# Patient Record
Sex: MALE | Race: WHITE | Employment: OTHER | URBAN - METROPOLITAN AREA
[De-identification: names, ages, dates, MRNs, and addresses within clinical notes are randomized per-mention and may not be internally consistent; named-entity substitution may affect disease eponyms.]

---

## 2023-01-25 DIAGNOSIS — G50.0 TRIGEMINAL NEURALGIA OF RIGHT SIDE OF FACE: Primary | ICD-10-CM

## 2023-02-01 RX ORDER — POTASSIUM CHLORIDE 20MEQ/15ML
LIQUID (ML) ORAL DAILY
COMMUNITY

## 2023-02-01 RX ORDER — ELECTROLYTES/DEXTROSE
SOLUTION, ORAL ORAL
COMMUNITY

## 2023-02-01 RX ORDER — MELOXICAM 15 MG/1
15 TABLET ORAL DAILY
COMMUNITY

## 2023-02-01 RX ORDER — AMLODIPINE BESYLATE 10 MG/1
10 TABLET ORAL DAILY
COMMUNITY

## 2023-02-01 RX ORDER — LANOLIN ALCOHOL/MO/W.PET/CERES
3 CREAM (GRAM) TOPICAL DAILY
COMMUNITY

## 2023-02-01 RX ORDER — PREGABALIN 300 MG/1
300 CAPSULE ORAL 2 TIMES DAILY
COMMUNITY

## 2023-02-01 RX ORDER — OMEPRAZOLE 20 MG/1
20 CAPSULE, DELAYED RELEASE ORAL DAILY
COMMUNITY

## 2023-02-01 RX ORDER — BUPRENORPHINE HYDROCHLORIDE AND NALOXONE HYDROCHLORIDE DIHYDRATE 8; 2 MG/1; MG/1
1.5 TABLET SUBLINGUAL DAILY
COMMUNITY

## 2023-02-01 RX ORDER — MIRTAZAPINE 30 MG/1
15 TABLET, FILM COATED ORAL NIGHTLY
COMMUNITY

## 2023-02-01 RX ORDER — OXCARBAZEPINE 300 MG/1
300 TABLET, FILM COATED ORAL 2 TIMES DAILY
COMMUNITY

## 2023-02-06 ENCOUNTER — HOSPITAL ENCOUNTER (OUTPATIENT)
Dept: CT IMAGING | Age: 73
Discharge: HOME | End: 2023-02-06
Payer: MEDICARE

## 2023-02-06 ENCOUNTER — HOSPITAL ENCOUNTER (OUTPATIENT)
Dept: MRI IMAGING | Age: 73
Discharge: HOME | End: 2023-02-06
Payer: MEDICARE

## 2023-02-06 ENCOUNTER — ANESTHESIA EVENT (OUTPATIENT)
Dept: RADIATION ONCOLOGY | Age: 73
End: 2023-02-06

## 2023-02-06 ENCOUNTER — HOSPITAL ENCOUNTER (OUTPATIENT)
Dept: RADIATION ONCOLOGY | Age: 73
Discharge: HOME | End: 2023-02-06
Payer: MEDICARE

## 2023-02-06 ENCOUNTER — ANESTHESIA (OUTPATIENT)
Dept: RADIATION ONCOLOGY | Age: 73
End: 2023-02-06

## 2023-02-06 VITALS
RESPIRATION RATE: 18 BRPM | OXYGEN SATURATION: 96 % | HEIGHT: 70 IN | HEART RATE: 57 BPM | WEIGHT: 173.2 LBS | DIASTOLIC BLOOD PRESSURE: 87 MMHG | TEMPERATURE: 98.1 F | SYSTOLIC BLOOD PRESSURE: 120 MMHG | BODY MASS INDEX: 24.79 KG/M2

## 2023-02-06 PROCEDURE — 6360000002 HC RX W HCPCS: Performed by: NEUROLOGICAL SURGERY

## 2023-02-06 PROCEDURE — 3700000001 HC ADD 15 MINUTES (ANESTHESIA)

## 2023-02-06 PROCEDURE — 6360000004 HC RX CONTRAST MEDICATION: Performed by: NEUROLOGICAL SURGERY

## 2023-02-06 PROCEDURE — 2500000003 HC RX 250 WO HCPCS: Performed by: NEUROLOGICAL SURGERY

## 2023-02-06 PROCEDURE — 3700000000 HC ANESTHESIA ATTENDED CARE

## 2023-02-06 PROCEDURE — 6370000000 HC RX 637 (ALT 250 FOR IP): Performed by: NEUROLOGICAL SURGERY

## 2023-02-06 PROCEDURE — A9579 GAD-BASE MR CONTRAST NOS,1ML: HCPCS | Performed by: NEUROLOGICAL SURGERY

## 2023-02-06 PROCEDURE — 70470 CT HEAD/BRAIN W/O & W/DYE: CPT

## 2023-02-06 PROCEDURE — 70553 MRI BRAIN STEM W/O & W/DYE: CPT

## 2023-02-06 RX ORDER — DEXAMETHASONE SODIUM PHOSPHATE 4 MG/ML
4 INJECTION, SOLUTION INTRA-ARTICULAR; INTRALESIONAL; INTRAMUSCULAR; INTRAVENOUS; SOFT TISSUE ONCE
Status: COMPLETED | OUTPATIENT
Start: 2023-02-06 | End: 2023-02-06

## 2023-02-06 RX ORDER — PREGABALIN 150 MG/1
300 CAPSULE ORAL ONCE
Status: COMPLETED | OUTPATIENT
Start: 2023-02-06 | End: 2023-02-06

## 2023-02-06 RX ORDER — AMLODIPINE BESYLATE 10 MG/1
10 TABLET ORAL ONCE
Status: COMPLETED | OUTPATIENT
Start: 2023-02-06 | End: 2023-02-06

## 2023-02-06 RX ORDER — ONDANSETRON 2 MG/ML
4 INJECTION INTRAMUSCULAR; INTRAVENOUS ONCE
Status: COMPLETED | OUTPATIENT
Start: 2023-02-06 | End: 2023-02-06

## 2023-02-06 RX ORDER — PROPOFOL 10 MG/ML
INJECTION, EMULSION INTRAVENOUS PRN
Status: DISCONTINUED | OUTPATIENT
Start: 2023-02-06 | End: 2023-02-06 | Stop reason: SDUPTHER

## 2023-02-06 RX ORDER — BUPIVACAINE HYDROCHLORIDE 5 MG/ML
30 INJECTION, SOLUTION EPIDURAL; INTRACAUDAL ONCE
Status: COMPLETED | OUTPATIENT
Start: 2023-02-06 | End: 2023-02-06

## 2023-02-06 RX ORDER — ACETAMINOPHEN 325 MG/1
650 TABLET ORAL EVERY 4 HOURS PRN
Status: DISCONTINUED | OUTPATIENT
Start: 2023-02-06 | End: 2023-02-07 | Stop reason: HOSPADM

## 2023-02-06 RX ORDER — 0.9 % SODIUM CHLORIDE 0.9 %
500 INTRAVENOUS SOLUTION INTRAVENOUS ONCE
Status: DISCONTINUED | OUTPATIENT
Start: 2023-02-06 | End: 2023-02-07 | Stop reason: HOSPADM

## 2023-02-06 RX ORDER — OXCARBAZEPINE 300 MG/1
300 TABLET, FILM COATED ORAL ONCE
Status: COMPLETED | OUTPATIENT
Start: 2023-02-06 | End: 2023-02-06

## 2023-02-06 RX ORDER — EVENING PRIMROSE OIL 500 MG
100 CAPSULE ORAL ONCE
Status: DISCONTINUED | OUTPATIENT
Start: 2023-02-06 | End: 2023-02-07 | Stop reason: HOSPADM

## 2023-02-06 RX ORDER — SODIUM BICARBONATE 42 MG/ML
1.5 INJECTION, SOLUTION INTRAVENOUS ONCE
Status: COMPLETED | OUTPATIENT
Start: 2023-02-06 | End: 2023-02-06

## 2023-02-06 RX ORDER — BACITRACIN, NEOMYCIN, POLYMYXIN B 400; 3.5; 5 [USP'U]/G; MG/G; [USP'U]/G
OINTMENT TOPICAL ONCE
Status: COMPLETED | OUTPATIENT
Start: 2023-02-06 | End: 2023-02-06

## 2023-02-06 RX ORDER — LORAZEPAM 2 MG/ML
1 INJECTION INTRAMUSCULAR EVERY 6 HOURS PRN
Status: DISCONTINUED | OUTPATIENT
Start: 2023-02-06 | End: 2023-02-07 | Stop reason: HOSPADM

## 2023-02-06 RX ORDER — SODIUM CHLORIDE, SODIUM LACTATE, POTASSIUM CHLORIDE, CALCIUM CHLORIDE 600; 310; 30; 20 MG/100ML; MG/100ML; MG/100ML; MG/100ML
INJECTION, SOLUTION INTRAVENOUS CONTINUOUS PRN
Status: DISCONTINUED | OUTPATIENT
Start: 2023-02-06 | End: 2023-02-06 | Stop reason: SDUPTHER

## 2023-02-06 RX ORDER — LIDOCAINE HYDROCHLORIDE 10 MG/ML
30 INJECTION, SOLUTION EPIDURAL; INFILTRATION; INTRACAUDAL; PERINEURAL ONCE
Status: COMPLETED | OUTPATIENT
Start: 2023-02-06 | End: 2023-02-06

## 2023-02-06 RX ADMIN — BACITRACIN, NEOMYCIN, POLYMYXIN B: 400; 3.5; 5 OINTMENT TOPICAL at 08:02

## 2023-02-06 RX ADMIN — GADOTERIDOL 17 ML: 279.3 INJECTION, SOLUTION INTRAVENOUS at 10:20

## 2023-02-06 RX ADMIN — LIDOCAINE HYDROCHLORIDE 30 ML: 10 INJECTION, SOLUTION EPIDURAL; INFILTRATION; INTRACAUDAL; PERINEURAL at 07:40

## 2023-02-06 RX ADMIN — PREGABALIN 300 MG: 150 CAPSULE ORAL at 08:04

## 2023-02-06 RX ADMIN — PROPOFOL 20 MG: 10 INJECTION, EMULSION INTRAVENOUS at 07:40

## 2023-02-06 RX ADMIN — PROPOFOL 20 MG: 10 INJECTION, EMULSION INTRAVENOUS at 07:43

## 2023-02-06 RX ADMIN — PROPOFOL 50 MG: 10 INJECTION, EMULSION INTRAVENOUS at 07:39

## 2023-02-06 RX ADMIN — SODIUM CHLORIDE, SODIUM LACTATE, POTASSIUM CHLORIDE, CALCIUM CHLORIDE: 600; 310; 30; 20 INJECTION, SOLUTION INTRAVENOUS at 07:36

## 2023-02-06 RX ADMIN — SODIUM BICARBONATE 1.5 MEQ: 42 INJECTION, SOLUTION INTRAVENOUS at 07:40

## 2023-02-06 RX ADMIN — OXCARBAZEPINE 300 MG: 300 TABLET, FILM COATED ORAL at 08:03

## 2023-02-06 RX ADMIN — PROPOFOL 20 MG: 10 INJECTION, EMULSION INTRAVENOUS at 07:41

## 2023-02-06 RX ADMIN — ONDANSETRON 4 MG: 2 INJECTION INTRAMUSCULAR; INTRAVENOUS at 07:18

## 2023-02-06 RX ADMIN — IOPAMIDOL 80 ML: 755 INJECTION, SOLUTION INTRAVENOUS at 08:53

## 2023-02-06 RX ADMIN — BUPIVACAINE HYDROCHLORIDE 150 MG: 5 INJECTION, SOLUTION EPIDURAL; INTRACAUDAL; PERINEURAL at 07:40

## 2023-02-06 RX ADMIN — AMLODIPINE BESYLATE 10 MG: 10 TABLET ORAL at 08:03

## 2023-02-06 RX ADMIN — DEXAMETHASONE SODIUM PHOSPHATE 4 MG: 4 INJECTION, SOLUTION INTRAMUSCULAR; INTRAVENOUS at 07:40

## 2023-02-06 RX ADMIN — PROPOFOL 20 MG: 10 INJECTION, EMULSION INTRAVENOUS at 07:45

## 2023-02-06 ASSESSMENT — PAIN DESCRIPTION - LOCATION: LOCATION: FACE;MOUTH

## 2023-02-06 ASSESSMENT — PAIN DESCRIPTION - PAIN TYPE: TYPE: CHRONIC PAIN

## 2023-02-06 ASSESSMENT — PAIN DESCRIPTION - DESCRIPTORS: DESCRIPTORS: ACHING

## 2023-02-06 ASSESSMENT — PAIN DESCRIPTION - ORIENTATION: ORIENTATION: RIGHT

## 2023-02-06 NOTE — PROGRESS NOTES
Gamma Knife Radiation Delivery Time Out    1. Patient states name and birthdate correctly? Yes    2. Procedure listed on consent Stereotactic Radiosurgery, Gamma Knife for RIGHT trigeminal neuralgia    3. Is this the correct procedure? Yes    4. Is this a trigeminal neuralgia case? Yes    -If yes, what side are we treating? right    -If yes, is the side marked for laterality? yes    5. Has the final radiologist report been reviewed? yes    6. Does the patient require Keppra prior to treatment delivery? no    7. Does the patient require Ativan prior to treatment delivery?  no    8. Are all present in agreement? Yes    9.  Those present for time out:  Dr. Jo Ann Johns Dr. Vance Records, RN, La Long, RN     La Long, RN

## 2023-02-06 NOTE — ADDENDUM NOTE
Addendum  created 02/06/23 1003 by RADHA Javier - CRNA    Flowsheet accepted, Intraprocedure Flowsheets edited

## 2023-02-06 NOTE — PROGRESS NOTES
After timeout was completed, the patient was put into the treatment position. Patient then started grabbing at the frame. This RN, Lamont Gilbert RN, Bonnie Pelletier, Dr. Juan Jose Galaviz, Dr. Leida Covington, and pt's wife were explaining to patient the importance of keeping his hands at his side during his treatment. Patient was insistent to get up from the treatment table. Patient was assisted to sit up on table per request while Dr. Andria Garibay and this RN explained the radiation portion of the treatment. Wife at table side also explaining that the treatment had not been completed yet. Patient agreed to continue with the treatment and was again placed back into the treatment position. CBCT was completed and treatment was started. Patient began pulling at frame and was attempting to wrap his arms around the outside of the gamma knife machine. Patient yelling \"get me out of here! \"  Treatment was immediately stopped and this DURGA, Bonnie Pelletier, Ritesh Gardner, and Lamont Gilbert RN down in treatment room. Patient immediately removed from treatment table and transferred back to room via wheelchair. Frame removed. Right posterior pin site bleeding noted. Sonja CALIXTO held pressure for 5 minutes. Pin sites were cleaned with alcohol and povidone-iodine. Dr. Andria Garibay then placed sutures at all four pin sites. Patient met Phase II discharge criteria. Pt alert and oriented to person only. See discharge Mavis score and vitals. Discharge instructions were reviewed with patient and patient's spouse who verbalized understanding using teach back method. A written copy of the instructions was given. Patient has follow up calls scheduled. Patient was accompanied to transportation by this RN.     Peggi Claude, RN

## 2023-02-06 NOTE — ANESTHESIA PRE PROCEDURE
Department of Anesthesiology  Preprocedure Note       Name:  Usman Chen   Age:  81 y.o.  :  1950                                          MRN:  [id number]         Date:  2023      Surgeon: * Surgery not found *    Procedure:     Medications prior to admission:   Prior to Admission medications    Medication Sig Start Date End Date Taking? Authorizing Provider   amLODIPine (NORVASC) 10 MG tablet Take 10 mg by mouth daily   Yes Historical Provider, MD   buprenorphine-naloxone (SUBOXONE) 8-2 MG SUBL SL tablet Place 1.5 tablets under the tongue daily. Yes Historical Provider, MD   melatonin 3 MG TABS tablet Take 3 mg by mouth daily   Yes Historical Provider, MD   meloxicam (MOBIC) 15 MG tablet Take 15 mg by mouth daily   Yes Historical Provider, MD   METHYL SALICYLATE-LIDO-MENTHOL EX Apply topically   Yes Historical Provider, MD   mirtazapine (REMERON) 30 MG tablet Take 15 mg by mouth nightly   Yes Historical Provider, MD   Multiple Vitamin (MULTIVITAMIN ADULT) TABS Take by mouth  Patient not taking: Reported on 2023   Yes Historical Provider, MD   omeprazole (PRILOSEC) 20 MG delayed release capsule Take 20 mg by mouth daily   Yes Historical Provider, MD   OXcarbazepine (TRILEPTAL) 300 MG tablet Take 300 mg by mouth 2 times daily   Yes Historical Provider, MD   potassium chloride 20 MEQ/15ML (10%) oral solution Take by mouth daily  Patient not taking: Reported on 2023   Yes Historical Provider, MD   pregabalin (LYRICA) 300 MG capsule Take 300 mg by mouth 2 times daily. Yes Historical Provider, MD       Current medications:    Current Outpatient Medications   Medication Sig Dispense Refill    amLODIPine (NORVASC) 10 MG tablet Take 10 mg by mouth daily      buprenorphine-naloxone (SUBOXONE) 8-2 MG SUBL SL tablet Place 1.5 tablets under the tongue daily.       melatonin 3 MG TABS tablet Take 3 mg by mouth daily      meloxicam (MOBIC) 15 MG tablet Take 15 mg by mouth daily      METHYL SALICYLATE-LIDO-MENTHOL EX Apply topically      mirtazapine (REMERON) 30 MG tablet Take 15 mg by mouth nightly      Multiple Vitamin (MULTIVITAMIN ADULT) TABS Take by mouth (Patient not taking: Reported on 2/6/2023)      omeprazole (PRILOSEC) 20 MG delayed release capsule Take 20 mg by mouth daily      OXcarbazepine (TRILEPTAL) 300 MG tablet Take 300 mg by mouth 2 times daily      potassium chloride 20 MEQ/15ML (10%) oral solution Take by mouth daily (Patient not taking: Reported on 2/6/2023)      pregabalin (LYRICA) 300 MG capsule Take 300 mg by mouth 2 times daily. Current Facility-Administered Medications   Medication Dose Route Frequency Provider Last Rate Last Admin    dexamethasone (DECADRON) injection 4 mg  4 mg IntraDERmal Once Chan Adler MD        bupivacaine (PF) (MARCAINE) 0.5 % injection 150 mg  30 mL IntraDERmal Once Chan Adler MD        0.9 % sodium chloride bolus  500 mL IntraVENous Once Chan Adler MD        lidocaine PF 1 % injection 30 mL  30 mL IntraDERmal Once Chan Adler MD        ondansetron [facility]) injection 4 mg  4 mg IntraVENous Once Chan Adler MD        neomycin-bacitracin-polymyxin (NEOSPORIN) ointment   Topical Once Chan Adler MD        sodium bicarbonate 4.2 % injection 1.5 mEq  1.5 mEq IntraDERmal Once Chan Adler MD        acetaminophen (TYLENOL) tablet 650 mg  650 mg Oral Q4H PRN Chan Adler MD        LORazepam (ATIVAN) injection 1 mg  1 mg IntraVENous Q6H PRN Chan Adler MD        vitamin E capsule 100 Units  100 Units Oral Once Chan Adler MD        OXcarbazepine (TRILEPTAL) tablet 300 mg  300 mg Oral Once Chan Adler MD        pregabalin (LYRICA) capsule 300 mg  300 mg Oral Once Chan Adler MD        amLODIPine (NORVASC) tablet 10 mg  10 mg Oral Once Chan Adler MD           Allergies:     Allergies   Allergen Reactions    Bupropion     Diclofenac     Etodolac Problem List:  There is no problem list on file for this patient. Past Medical History:        Diagnosis Date    AAA (abdominal aortic aneurysm)     Anxiety     Cervicalgia     Chronic back pain     Depression     HTN (hypertension)     Tobacco use     Trigeminal neuralgia of right side of face        Past Surgical History:        Procedure Laterality Date    APPENDECTOMY      BACK SURGERY      CHOLECYSTECTOMY      KNEE SURGERY      RHIZOTOMY W/ RADIOFREQUENCY ABLATION Right     percuaneous stereotactic radiofrequency rhizotomy (PSR) for trigeminal neuralgia    SHOULDER SURGERY         Social History:    Social History     Tobacco Use    Smoking status: Not on file    Smokeless tobacco: Not on file   Substance Use Topics    Alcohol use: Not on file                                Counseling given: Not Answered      Vital Signs (Current):   Vitals:    02/06/23 0628 02/06/23 0630   BP: (!) 143/80    Pulse: 59    Resp: 12    Temp:  98.1 °F (36.7 °C)   TempSrc:  Oral   SpO2: 92%    Weight: 173 lb 3.2 oz (78.6 kg)    Height: 5' 10\" (1.778 m)                                               BP Readings from Last 3 Encounters:   02/06/23 (!) 143/80       NPO Status:                                                                                 BMI:   Wt Readings from Last 3 Encounters:   02/06/23 173 lb 3.2 oz (78.6 kg)     Body mass index is 24.85 kg/m². CBC: No results found for: WBC, RBC, HGB, HCT, MCV, RDW, PLT    CMP: No results found for: NA, K, CL, CO2, BUN, CREATININE, GFRAA, AGRATIO, LABGLOM, GLUCOSE, GLU, PROT, CALCIUM, BILITOT, ALKPHOS, AST, ALT    POC Tests: No results for input(s): POCGLU, POCNA, POCK, POCCL, POCBUN, POCHEMO, POCHCT in the last 72 hours.     Coags: No results found for: PROTIME, INR, APTT    HCG (If Applicable): No results found for: PREGTESTUR, PREGSERUM, HCG, HCGQUANT     ABGs: No results found for: PHART, PO2ART, KRV0PKI, HGP1ELQ, BEART, Z6RCUSZN     Type & Screen (If Applicable):  No results found for: LABABO, LABRH    Drug/Infectious Status (If Applicable):  No results found for: HIV, HEPCAB    COVID-19 Screening (If Applicable): No results found for: COVID19        Anesthesia Evaluation  Patient summary reviewed and Nursing notes reviewed no history of anesthetic complications:   Airway: Mallampati: II  TM distance: >3 FB   Neck ROM: full  Mouth opening: > = 3 FB   Dental:    (+) upper dentures and lower dentures      Pulmonary:                              Cardiovascular:    (+) hypertension:,                   Neuro/Psych:   (+) depression/anxiety              ROS comment: Trigeminal Neuralgia GI/Hepatic/Renal: Neg GI/Hepatic/Renal ROS            Endo/Other: Negative Endo/Other ROS                    Abdominal:             Vascular:   + PVD, aortic or cerebral, . Other Findings:           Anesthesia Plan      MAC     ASA 1    (77-year-old male presents for application of G-frame for gamma knife radiation procedure. Plan MAC with ASA standard monitors. Questions answered. Patient agreeable with anesthetic plan.    )  Induction: intravenous. Anesthetic plan and risks discussed with patient. Plan discussed with CRNA.     Attending anesthesiologist reviewed and agrees with Rosibel Trujillo MD   2/6/2023

## 2023-02-06 NOTE — PROGRESS NOTES
Gamma Knife Frame Placement Time Out     1. Patient states name and birthdate correctly? Yes    2. Procedure listed on consent:  G-Frame placement and Gamma Knife radiosurgery for RIGHT trigeminal neuralgia    3. Is this the correct procedure? Yes    4. Are the consents signed? Yes    5. Is this a trigeminal neuralgia case? Yes    -If yes, what side are we treating? right    -If yes, is the side marked for laterality? yes    6. Have films been reviewed today? Yes    7. Has the interim History and Physical form been completed? yes    8. Has the neurosurgeon reviewed the [facility] RN Pre-Procedure Checklist?  Yes    9. FEMALES ONLY: Does the patient require a pregnancy test per [address]? no     -If yes, the result was:  na    10. Are all present in agreement?   Yes    Those present for time out:  Rocio Velazquez RN , Dr. Lashawn Davenport, DENISSE Velazquez RN

## 2023-02-06 NOTE — DISCHARGE INSTR - COC
Continuity of Care Form    Patient Name: Rafal Duncan   :  1950  MRN:  [id number]    Admit date:  2023  Discharge date:  ***    Code Status Order: No Order   Advance Directives:     Admitting Physician:  No admitting provider for patient encounter. PCP: No primary care provider on file. Discharging Nurse: [facility] Unit/Room#: No information available for this encounter. Discharging Unit Phone Number: ***    Emergency Contact:   No emergency contact information on file. Past Surgical History:  Past Surgical History:   Procedure Laterality Date    APPENDECTOMY      BACK SURGERY      CHOLECYSTECTOMY      KNEE SURGERY      RHIZOTOMY W/ RADIOFREQUENCY ABLATION Right     percuaneous stereotactic radiofrequency rhizotomy (PSR) for trigeminal neuralgia    SHOULDER SURGERY         Immunization History: There is no immunization history on file for this patient. Active Problems: There is no problem list on file for this patient. Isolation/Infection:   Isolation            No Isolation          Patient Infection Status       None to display            Nurse Assessment:  Last Vital Signs: /61   Pulse 54   Temp 98.1 °F (36.7 °C) (Oral)   Resp 12   Ht 5' 10\" (1.778 m)   Wt 173 lb 3.2 oz (78.6 kg)   SpO2 96%   BMI 24.85 kg/m²     Last documented pain score (0-10 scale):    Last Weight:   Wt Readings from Last 1 Encounters:   23 173 lb 3.2 oz (78.6 kg)     Mental Status:  {IP PT MENTAL STATUS:64755}    IV Access:  { MARTELL IV ACCESS:809226672}    Nursing Mobility/ADLs:  Walking   {CHP DME ESLA:212483232}  Transfer  {CHP DME IOSK:656372624}  Bathing  {CHP DME DBK}  Dressing  {CHP DME DIIM:080981054}  Toileting  {CHP DME CJUI:513669500}  Feeding  {CHP DME IHGY:934130587}  Med Admin  {CHP DME EVAS:382257000}  Med Delivery   { MARTELL MED Delivery:079360746}    Wound Care Documentation and Therapy:        Elimination:  Continence:    Bowel: {YES / TK:17569}  Bladder: {YES / NO:00394}  Urinary Catheter: {Urinary Catheter:638838015}   Colostomy/Ileostomy/Ileal Conduit: {YES / OO:59479}       Date of Last BM: ***    Intake/Output Summary (Last 24 hours) at 2023 1105  Last data filed at 2023 0752  Gross per 24 hour   Intake 200 ml   Output --   Net 200 ml     No intake/output data recorded.     Safety Concerns:     [address] Safety Concerns:839824906}    Impairments/Disabilities:      [address] [facility] Impairments/Disabilities:404373640}    Nutrition Therapy:  Current Nutrition Therapy:   [facility] Diet List:545366523}    Routes of Feeding: {CHP DME Other Feedings:211719575}  Liquids: {Slp liquid thickness:90873}  Daily Fluid Restriction: {CHP DME Yes amt example:650927677}  Last Modified Barium Swallow with Video (Video Swallowing Test): {Done Not Done KCXC:771542851}    Treatments at the Time of Hospital Discharge:   Respiratory Treatments: ***  Oxygen Therapy:  {Therapy; copd oxygen:02211}  Ventilator:    { CC Vent XWSW:032128211}    Rehab Therapies: {THERAPEUTIC INTERVENTION:0609678646}  Weight Bearing Status/Restrictions: [address] [place] [address] Weight Bearin}  Other Medical Equipment (for information only, NOT a DME order):  {EQUIPMENT:580602804}  Other Treatments: ***    Patient's personal belongings (please select all that are sent with patient):  {[facility] DME Belongings:192813085}    RN SIGNATURE:  {Esignature:917080330}    CASE MANAGEMENT/SOCIAL WORK SECTION    Inpatient Status Date: ***    Readmission Risk Assessment Score:  Readmission Risk              Risk of Unplanned Readmission:  0           Discharging to Facility/ Agency   Name:   Address:  Phone:  Fax:    Dialysis Facility (if applicable)   Name:  Address:  Dialysis Schedule:  Phone:  Fax:    / signature: {Esignature:103782795}    PHYSICIAN SECTION    Prognosis: {Prognosis:9790583108}    Condition at Discharge: [facility] Patient Condition:342919087}    Rehab Potential (if transferring to Rehab): {Prognosis:4341199473}    Recommended Labs or Other Treatments After Discharge: ***    Physician Certification: I certify the above information and transfer of Paralee Edward  is necessary for the continuing treatment of the diagnosis listed and that he requires {Admit to Appropriate Level of Care:18563} for {GREATER/LESS:330115695} 30 days.      Update Admission H&P: {CHP DME Changes in LNJK:003345152}    PHYSICIAN SIGNATURE:  {Esignature:545543104}

## 2023-02-06 NOTE — PROCEDURES
[facility]     Gamma Knife Radiosurgery Procedure Note      Diagnosis: right trigeminal neuralgia      Description of procedure:      Marilyn Lima was met at the [facility] today by Dr. Mary Ann Yun and myself. Conscious sedation was accomplished by anesthesia and stereotactic frame placement was completed by Dr. Mary Ann Yun. A stereotactic MRI of the brain with double-dose contrast was then completed. The following lesion(s) was/were identified and targeted: right trigeminal nerve. Dosimetry was reviewed by Dr. Genesis Telles MS (special physics consult requested) and myself. Just prior to starting the treatment, the patient became agitated and refused to undergo treatment. Multiple discussions were made between the patient, the medical team and the patient's spouse. Ultimately he wished to abort the procedure and his wishes were complied with. The headframe was removed. We have recommended that we reschedule the procedure under general anesthesia.        Lauren Capone MD

## 2023-02-06 NOTE — PROGRESS NOTES
Patient received MAC under the supervision of Dr. Jj Mascorro CRNA. This RN was present throughout [place] [address] and assumed care from anesthesia for Phase II recovery. The patient is awake and breathing easily. Patient denies pain. See Flow Sheet for vitals and Mavis Score.     Lucy Bryson RN

## 2023-02-06 NOTE — ADDENDUM NOTE
Addendum  created 02/06/23 1103 by RADHA Medina CRNA    Flowsheet accepted, Intraprocedure Event edited, Intraprocedure Flowsheets edited, Intraprocedure Meds edited, Orders acknowledged in Narrator

## 2023-02-06 NOTE — DISCHARGE INSTRUCTIONS
GAMMA KNIFE POST-PROCEDURE INSTRUCTIONS    You may gently wash your hair and face two days after your procedure. Be gentle and do not rub the pin sites. Pat areas dry. WHAT TO DO IF YOU EXPERIENCE BLEEDING AT THE PIN SITES:  Remove any steri strips or band-aids (if present) that are in place at the site that is bleeding. Using the gauze pads given to you by the [facility] RN, apply direct, one finger pressure continuously for 5 minutes. After 5 minutes inspect the site for bleeding. If the bleeding continues, repeat holding pressure, this time for 10 minutes. After 10 minutes, inspect the site again. If bleeding continues, proceed to the nearest emergency room for placement of a suture (stich). If you have sutures (stitches), you may shower with them, but keep them clean and dry otherwise. They usually dissolve on their own in 2-3 weeks, but if they do not, they will be removed at your two-week follow up visit. You have undergone a procedure with sedation. You have been given medicines that affect your judgment or impair your ability to operate heavy machines and automobiles. You may not drive for 24 hours after your procedure. Please see the attached information regarding Sedation. Avoid hair spray, mousse, gels, hair color, permanent solutions, or any other products that could cause irritation until your pin sites heal.  You may notice blood-tinged water when washing your hair. This may be dried blood in your hair from the application of the head frame. Swelling above the eyes may occur within 48 hours of surgery. You may apply cool compresses to your forehead if swelling occurs. Some patients experiences tingling or temporary numbness above the pin sites. You may return to work or school after 24 hours if you feel well enough. You should resume all of your regular activities unless the physician has instructed you otherwise. You may resume your usual diet right away.   If you are feeling nauseated, start with a bland diet. If you develop any signs of infection (redness, swelling, drainage or irritation at pin sites, fever >101 F for more than 24 hours), call your physician. Do NOT stop taking your medications. Follow up with your physician as directed. If you have any questions during regular business hours, call the [facility] nurse at [phone]. For questions during off-business hours and on weekends, contact Dr. Gael Hilliard office directly at [phone].

## 2023-02-06 NOTE — H&P
The patient was seen and examined. There have been no changes in the patient's condition since the history and physical.    I reviewed the benefits, risks, and alternatives and the patient consents to the procedure. A vitamin E capsule was placed on the side of pain (RIGHT). Zeenat Cruz MD

## 2023-02-06 NOTE — ANESTHESIA POSTPROCEDURE EVALUATION
Department of Anesthesiology  Postprocedure Note    Patient: Deidre Pereira  MRN: [id number]  YOB: 1950  Date of evaluation: 2/6/2023      Procedure Summary     Date: 02/06/23 Room / Location: [address] Kn[address]    Anesthesia Start: 0736 Anesthesia Stop: Lily Baas    Procedure: GAMMAKNIFE W ANESTHESIA Diagnosis:     Scheduled Providers: Jeremias Garcia MD Responsible Provider:     Anesthesia Type: MAC ASA Status: 3          Anesthesia Type: No value filed.     Mavis Phase I:      Mavis Phase II:        Anesthesia Post Evaluation    Patient location during evaluation: PACU  Patient participation: complete - patient participated  Level of consciousness: awake and alert  Pain score: 0  Airway patency: patent  Nausea & Vomiting: no nausea and no vomiting  Complications: no  Cardiovascular status: hemodynamically stable  Respiratory status: acceptable  Hydration status: euvolemic

## 2023-02-06 NOTE — PROGRESS NOTES
After G-frame placement, patient was taken to MRI and CT by this RN where SpO2 and pulse were monitored and remained stable throughout. Patient had some difficulty tolerating the MRI scan and required IV ativan to be administered 1/2 way through the MRI. Pt seems to be somewhat confused (oriented to self and place only) after the scan but is responding well to his wife, who is at the bedside.   Yuliya Gastelum RN

## 2023-02-06 NOTE — OP NOTE
[facility]  PATIENT NAME:   Rachel Gomez   MR #:  [id number]  YOB: 1950   ACCOUNT #:  [de-identified]  SURGEON:  Martina Ayon MD   ADMIT DATE:  2/6/2023  SERVICE:  Neurosurgery  DICTATED BY: Martina Ayon MD   SURGERY DATE:  2/6/2023           OPERATIVE REPORT       PREOPERATIVE DIAGNOSIS:     1. RIGHT trigeminal neuralgia     POSTOPERATIVE DIAGNOSIS:     1. RIGHT trigeminal neuralgia    PROCEDURE(S) PERFORMED:    Placement of stereotactic head frame   Removal of stereotactic head frame    NEUROSURGEON: Martina Ayon MD      RADIATION ONCOLOGIST: Pola Cox MD    ANESTHESIA: Moderate sedation    COMPLICATIONS: None    INDICATIONS: This is a 70-year-old man with an eight-month history of RIGHT V2-3 trigeminal neuralgia. The patient underwent a RIGHT PSR procedure on 8/23/2022 by Dr. Allen Ramires with relief of RIGHT V3 pain and persistent RIGHT V2 pain. He remains on Trileptal and Lyrica. We discussed various treatment options but ultimately recommended Gamma Knife radiosurgery. The patient was aware of the potential benefits in terms of pain relief and the possible risks including (but not limited to): pin site bleeding/swelling/infection, facial numbness, brain swelling, new neurological symptoms, and/or radiation injury the brain. PERFORMANCE STATUS: 90%    DETAILS OF PROCEDURE: The side of pain was confirmed by the patient, family, nurse, and physician. A vitamin E capsule was affixed to the skin on that side for later visualization on MRI. The four pin sites were cleaned with Chloraprep and injected with a mixture of Lidocaine 1%, Marcaine 0.5%, and sodium bicarbonate. The stereotactic head frame was secured with titanium screws. The patient underwent a stereotactic CT and MRI scans with contrast. These images were sent over the network to the [address] [place] [address]. The target and critical structures were contoured.  An optimal treatment plan was developed by the neurosurgeon, radiation oncologist, and medical physicist. All critical structure constraints were fulfilled. A cone-beam CT scan was performed in the treatment position to confirm accuracy. The patient then became agitated and refused to undergo radiation despite conversations with the medical team and his spouse. I complied with his wish and removed the stereotactic head frame. I recommended that we reschedule the procedure under general anesthesia.      SPECIMENS REMOVED: None    ESTIMATED BLOOD LOSS: None    TOTAL TIME SPENT WITH PATIENT: In conformance with CMS regulations, I affirm that I was present for the entire neurosurgical portion of the procedure including stereotactic frame placement, target definition, development of the treatment plan, and stereotactic frame removal.     Milly Zimmerman MD

## 2023-02-07 ENCOUNTER — POST-OP TELEPHONE (OUTPATIENT)
Dept: RADIATION ONCOLOGY | Age: 73
End: 2023-02-07

## 2023-02-07 NOTE — PROGRESS NOTES
Spoke to patient POD #1 from [facility] radiosurgery. Patient denies bleeding, swelling, headache, or fever. Reinforced all post-procedure instructions. Patient states he has no recollection of terminating his treatment yesterday. Patient states his trigeminal pain is 4/10 today. This RN asked to speak with wife but she was not available at the time. This RN explained that Dr. Fanny Hough spoke with pt's wife yesterday about patient being treated another day under general anesthesia. Patient states he will talk with wife and will call back on Thursday to book another treatment day. Patient verbalized understanding to call with any new neurological symptoms. Encouraged to call with any questions or concerns.      Jair Stringer RN

## 2023-02-27 ENCOUNTER — ANESTHESIA (OUTPATIENT)
Dept: RADIATION ONCOLOGY | Age: 73
End: 2023-02-27

## 2023-02-27 ENCOUNTER — ANESTHESIA EVENT (OUTPATIENT)
Dept: RADIATION ONCOLOGY | Age: 73
End: 2023-02-27

## 2023-02-27 ENCOUNTER — HOSPITAL ENCOUNTER (OUTPATIENT)
Dept: MRI IMAGING | Age: 73
Discharge: HOME | End: 2023-02-27

## 2023-02-27 ENCOUNTER — HOSPITAL ENCOUNTER (OUTPATIENT)
Dept: RADIATION ONCOLOGY | Age: 73
Discharge: HOME | End: 2023-02-27
Payer: MEDICARE

## 2023-02-27 VITALS
WEIGHT: 176.3 LBS | OXYGEN SATURATION: 92 % | TEMPERATURE: 97.1 F | HEART RATE: 62 BPM | RESPIRATION RATE: 18 BRPM | BODY MASS INDEX: 25.24 KG/M2 | HEIGHT: 70 IN | SYSTOLIC BLOOD PRESSURE: 119 MMHG | DIASTOLIC BLOOD PRESSURE: 64 MMHG

## 2023-02-27 PROCEDURE — 3700000001 HC ADD 15 MINUTES (ANESTHESIA)

## 2023-02-27 PROCEDURE — 7100000000 HC PACU RECOVERY - FIRST 15 MIN

## 2023-02-27 PROCEDURE — 2500000003 HC RX 250 WO HCPCS: Performed by: NEUROLOGICAL SURGERY

## 2023-02-27 PROCEDURE — 77336 RADIATION PHYSICS CONSULT: CPT

## 2023-02-27 PROCEDURE — 6360000002 HC RX W HCPCS: Performed by: NEUROLOGICAL SURGERY

## 2023-02-27 PROCEDURE — 7100000001 HC PACU RECOVERY - ADDTL 15 MIN

## 2023-02-27 PROCEDURE — 77371 SRS MULTISOURCE: CPT

## 2023-02-27 PROCEDURE — 77334 RADIATION TREATMENT AID(S): CPT

## 2023-02-27 PROCEDURE — 6370000000 HC RX 637 (ALT 250 FOR IP): Performed by: NEUROLOGICAL SURGERY

## 2023-02-27 PROCEDURE — 3700000000 HC ANESTHESIA ATTENDED CARE

## 2023-02-27 PROCEDURE — 77300 RADIATION THERAPY DOSE PLAN: CPT

## 2023-02-27 PROCEDURE — 77295 3-D RADIOTHERAPY PLAN: CPT

## 2023-02-27 RX ORDER — ONDANSETRON 2 MG/ML
4 INJECTION INTRAMUSCULAR; INTRAVENOUS
Status: DISCONTINUED | OUTPATIENT
Start: 2023-02-27 | End: 2023-02-28 | Stop reason: HOSPADM

## 2023-02-27 RX ORDER — DEXAMETHASONE SODIUM PHOSPHATE 4 MG/ML
4 INJECTION, SOLUTION INTRA-ARTICULAR; INTRALESIONAL; INTRAMUSCULAR; INTRAVENOUS; SOFT TISSUE ONCE
Status: COMPLETED | OUTPATIENT
Start: 2023-02-27 | End: 2023-02-27

## 2023-02-27 RX ORDER — BACITRACIN, NEOMYCIN, POLYMYXIN B 400; 3.5; 5 [USP'U]/G; MG/G; [USP'U]/G
OINTMENT TOPICAL ONCE
Status: COMPLETED | OUTPATIENT
Start: 2023-02-27 | End: 2023-02-27

## 2023-02-27 RX ORDER — SODIUM CHLORIDE 0.9 % (FLUSH) 0.9 %
5-40 SYRINGE (ML) INJECTION EVERY 12 HOURS SCHEDULED
Status: DISCONTINUED | OUTPATIENT
Start: 2023-02-27 | End: 2023-02-28 | Stop reason: HOSPADM

## 2023-02-27 RX ORDER — ONDANSETRON 2 MG/ML
4 INJECTION INTRAMUSCULAR; INTRAVENOUS ONCE
Status: COMPLETED | OUTPATIENT
Start: 2023-02-27 | End: 2023-02-27

## 2023-02-27 RX ORDER — SODIUM BICARBONATE 42 MG/ML
1.5 INJECTION, SOLUTION INTRAVENOUS ONCE
Status: COMPLETED | OUTPATIENT
Start: 2023-02-27 | End: 2023-02-27

## 2023-02-27 RX ORDER — 0.9 % SODIUM CHLORIDE 0.9 %
500 INTRAVENOUS SOLUTION INTRAVENOUS ONCE
Status: DISCONTINUED | OUTPATIENT
Start: 2023-02-27 | End: 2023-02-28 | Stop reason: HOSPADM

## 2023-02-27 RX ORDER — LIDOCAINE HYDROCHLORIDE 20 MG/ML
INJECTION, SOLUTION INFILTRATION; PERINEURAL PRN
Status: DISCONTINUED | OUTPATIENT
Start: 2023-02-27 | End: 2023-02-27 | Stop reason: SDUPTHER

## 2023-02-27 RX ORDER — ONDANSETRON 2 MG/ML
INJECTION INTRAMUSCULAR; INTRAVENOUS PRN
Status: DISCONTINUED | OUTPATIENT
Start: 2023-02-27 | End: 2023-02-27 | Stop reason: SDUPTHER

## 2023-02-27 RX ORDER — EVENING PRIMROSE OIL 500 MG
100 CAPSULE ORAL ONCE
Status: COMPLETED | OUTPATIENT
Start: 2023-02-27 | End: 2023-02-27

## 2023-02-27 RX ORDER — EPHEDRINE SULFATE 50 MG/ML
10 INJECTION, SOLUTION INTRAVENOUS ONCE
Status: COMPLETED | OUTPATIENT
Start: 2023-02-27 | End: 2023-02-27

## 2023-02-27 RX ORDER — SODIUM CHLORIDE, SODIUM LACTATE, POTASSIUM CHLORIDE, CALCIUM CHLORIDE 600; 310; 30; 20 MG/100ML; MG/100ML; MG/100ML; MG/100ML
INJECTION, SOLUTION INTRAVENOUS CONTINUOUS PRN
Status: DISCONTINUED | OUTPATIENT
Start: 2023-02-27 | End: 2023-02-27 | Stop reason: SDUPTHER

## 2023-02-27 RX ORDER — SODIUM CHLORIDE 9 MG/ML
INJECTION, SOLUTION INTRAVENOUS PRN
Status: DISCONTINUED | OUTPATIENT
Start: 2023-02-27 | End: 2023-02-28 | Stop reason: HOSPADM

## 2023-02-27 RX ORDER — PROPOFOL 10 MG/ML
INJECTION, EMULSION INTRAVENOUS PRN
Status: DISCONTINUED | OUTPATIENT
Start: 2023-02-27 | End: 2023-02-27 | Stop reason: SDUPTHER

## 2023-02-27 RX ORDER — MEPERIDINE HYDROCHLORIDE 25 MG/ML
12.5 INJECTION INTRAMUSCULAR; INTRAVENOUS; SUBCUTANEOUS EVERY 5 MIN PRN
Status: DISCONTINUED | OUTPATIENT
Start: 2023-02-27 | End: 2023-02-28 | Stop reason: HOSPADM

## 2023-02-27 RX ORDER — BUPIVACAINE HYDROCHLORIDE 5 MG/ML
30 INJECTION, SOLUTION EPIDURAL; INTRACAUDAL ONCE
Status: COMPLETED | OUTPATIENT
Start: 2023-02-27 | End: 2023-02-27

## 2023-02-27 RX ORDER — FENTANYL CITRATE 50 UG/ML
50 INJECTION, SOLUTION INTRAMUSCULAR; INTRAVENOUS EVERY 5 MIN PRN
Status: DISCONTINUED | OUTPATIENT
Start: 2023-02-27 | End: 2023-02-28 | Stop reason: HOSPADM

## 2023-02-27 RX ORDER — FENTANYL CITRATE 50 UG/ML
25 INJECTION, SOLUTION INTRAMUSCULAR; INTRAVENOUS EVERY 5 MIN PRN
Status: DISCONTINUED | OUTPATIENT
Start: 2023-02-27 | End: 2023-02-28 | Stop reason: HOSPADM

## 2023-02-27 RX ORDER — GLYCOPYRROLATE 0.2 MG/ML
INJECTION INTRAMUSCULAR; INTRAVENOUS PRN
Status: DISCONTINUED | OUTPATIENT
Start: 2023-02-27 | End: 2023-02-27 | Stop reason: SDUPTHER

## 2023-02-27 RX ORDER — OXYCODONE HYDROCHLORIDE 5 MG/1
5 TABLET ORAL
Status: DISCONTINUED | OUTPATIENT
Start: 2023-02-27 | End: 2023-02-28 | Stop reason: HOSPADM

## 2023-02-27 RX ORDER — HYDRALAZINE HYDROCHLORIDE 20 MG/ML
10 INJECTION INTRAMUSCULAR; INTRAVENOUS
Status: DISCONTINUED | OUTPATIENT
Start: 2023-02-27 | End: 2023-02-28 | Stop reason: HOSPADM

## 2023-02-27 RX ORDER — ROCURONIUM BROMIDE 10 MG/ML
INJECTION, SOLUTION INTRAVENOUS PRN
Status: DISCONTINUED | OUTPATIENT
Start: 2023-02-27 | End: 2023-02-27 | Stop reason: SDUPTHER

## 2023-02-27 RX ORDER — LIDOCAINE HYDROCHLORIDE 10 MG/ML
30 INJECTION, SOLUTION EPIDURAL; INFILTRATION; INTRACAUDAL; PERINEURAL ONCE
Status: COMPLETED | OUTPATIENT
Start: 2023-02-27 | End: 2023-02-27

## 2023-02-27 RX ORDER — LABETALOL HYDROCHLORIDE 5 MG/ML
10 INJECTION, SOLUTION INTRAVENOUS
Status: DISCONTINUED | OUTPATIENT
Start: 2023-02-27 | End: 2023-02-28 | Stop reason: HOSPADM

## 2023-02-27 RX ORDER — SODIUM CHLORIDE 0.9 % (FLUSH) 0.9 %
5-40 SYRINGE (ML) INJECTION PRN
Status: DISCONTINUED | OUTPATIENT
Start: 2023-02-27 | End: 2023-02-28 | Stop reason: HOSPADM

## 2023-02-27 RX ORDER — PROCHLORPERAZINE EDISYLATE 5 MG/ML
5 INJECTION INTRAMUSCULAR; INTRAVENOUS
Status: DISCONTINUED | OUTPATIENT
Start: 2023-02-27 | End: 2023-02-28 | Stop reason: HOSPADM

## 2023-02-27 RX ADMIN — ROCURONIUM BROMIDE 80 MG: 10 INJECTION, SOLUTION INTRAVENOUS at 08:11

## 2023-02-27 RX ADMIN — LIDOCAINE HYDROCHLORIDE 100 MG: 20 INJECTION, SOLUTION INFILTRATION; PERINEURAL at 08:09

## 2023-02-27 RX ADMIN — DEXAMETHASONE SODIUM PHOSPHATE 4 MG: 4 INJECTION, SOLUTION INTRAMUSCULAR; INTRAVENOUS at 09:23

## 2023-02-27 RX ADMIN — PROPOFOL 100 MG: 10 INJECTION, EMULSION INTRAVENOUS at 08:11

## 2023-02-27 RX ADMIN — Medication 100 UNITS: at 07:30

## 2023-02-27 RX ADMIN — ONDANSETRON 4 MG: 2 INJECTION INTRAMUSCULAR; INTRAVENOUS at 08:10

## 2023-02-27 RX ADMIN — LIDOCAINE HYDROCHLORIDE 30 ML: 10 INJECTION, SOLUTION EPIDURAL; INFILTRATION; INTRACAUDAL; PERINEURAL at 09:23

## 2023-02-27 RX ADMIN — BUPIVACAINE HYDROCHLORIDE 150 MG: 5 INJECTION, SOLUTION EPIDURAL; INTRACAUDAL at 09:23

## 2023-02-27 RX ADMIN — PROPOFOL 50 MG: 10 INJECTION, EMULSION INTRAVENOUS at 08:12

## 2023-02-27 RX ADMIN — PROPOFOL 100 MG: 10 INJECTION, EMULSION INTRAVENOUS at 08:10

## 2023-02-27 RX ADMIN — ONDANSETRON 4 MG: 2 INJECTION INTRAMUSCULAR; INTRAVENOUS at 08:55

## 2023-02-27 RX ADMIN — GLYCOPYRROLATE 0.1 MG: 0.2 INJECTION INTRAMUSCULAR; INTRAVENOUS at 08:15

## 2023-02-27 RX ADMIN — GLYCOPYRROLATE 0.1 MG: 0.2 INJECTION INTRAMUSCULAR; INTRAVENOUS at 08:07

## 2023-02-27 RX ADMIN — SODIUM BICARBONATE 1.5 MEQ: 42 INJECTION, SOLUTION INTRAVENOUS at 09:23

## 2023-02-27 RX ADMIN — SODIUM CHLORIDE, SODIUM LACTATE, POTASSIUM CHLORIDE, CALCIUM CHLORIDE: 600; 310; 30; 20 INJECTION, SOLUTION INTRAVENOUS at 07:55

## 2023-02-27 RX ADMIN — BACITRACIN ZINC, NEOMYCIN SULFATE, POLYMYXIN B SULFATE: 3.5; 5000; 4 OINTMENT TOPICAL at 11:45

## 2023-02-27 RX ADMIN — ROCURONIUM BROMIDE 20 MG: 10 INJECTION, SOLUTION INTRAVENOUS at 09:25

## 2023-02-27 RX ADMIN — EPHEDRINE SULFATE 10 MG: 50 INJECTION, SOLUTION INTRAVENOUS at 09:23

## 2023-02-27 RX ADMIN — GLYCOPYRROLATE 0.1 MG: 0.2 INJECTION INTRAMUSCULAR; INTRAVENOUS at 08:04

## 2023-02-27 NOTE — H&P
The patient was seen and examined. There have been no changes in the patient's condition since the history and physical.    I reviewed the benefits, risks, and alternatives and the patient consents to the procedure.    A vitamin E capsule was placed on the side of pain (RIGHT).     Beni Ocampo MD

## 2023-02-27 NOTE — PROGRESS NOTES
Gamma Knife RN Pre-Procedure Checklist     1. Has the patient ever had any surgery on the head or neck? No    -If yes, is the surgery site marked? N/A    2. Has the patient ever received radiation treatment to the head or neck? Yes - briefly before needing to terminate treatment      -If yes, is the treatment summary and/or disk of treatment plan available? Yes      -If the patient has had previous Gamma Knife radiosurgery has the most recent imaging been reviewed in the Gamma Plan? Yes 2/6      3. Has the patient received chemotherapy or immunotherapy in the past month? No      -If yes, list the agent and date of last treatment. N/A    4. Is patient [de-identified] or older? No      -If yes, using all aluminum pins? No    5. List the procedure-specific medications taken by the patient this morning:   -Trileptal 300 mg   - Lyrica 300 mg    6. What is the patients GFR? N/A    -For GFR 0-30 => no contrast at MRI    -For GFR 31-59 => single dose contrast at MRI    -For GFR >60 => double dose contrast at MRI    7. IF FEMALE: Does the patient require a pregnancy test per [address] [facility]? N/A   -If yes, the result is: na    8.  What is the patient's baseline CO2? 40

## 2023-02-27 NOTE — PROGRESS NOTES
Patient arrived to [address] alert and oriented x 4 with wife. Patient is neurologically intact. PIV established without difficulty. Initial vitals WNL, and patient denies pain.         Noah Ravi RN

## 2023-02-27 NOTE — PROGRESS NOTES
Patient returned from PACU. Report received from [facility]. Discharge instructions were reviewed with patient and wife who verbalized understanding using teach back method. A written copy of the instructions was also given. Patient has follow up appointments scheduled. Patient was accompanied to transportation by this RN.     Kalia Cabral RN

## 2023-02-27 NOTE — PROGRESS NOTES
Gamma Knife Frame Placement Time Out     1. Patient states name and birthdate correctly? Yes    2. Procedure listed on consent:  G-Frame placement and Gamma Knife radiosurgery for RIGHT trigeminal neuralgia (under general anesthesia)    3. Is this the correct procedure? Yes    4. Are the consents signed? Yes    5. Is this a trigeminal neuralgia case? Yes    -If yes, what side are we treating? right    -If yes, is the side marked for laterality? yes    6. Have films been reviewed today? Yes    7. Has the interim History and Physical form been completed? yes    8. Has the neurosurgeon reviewed the [facility] RN Pre-Procedure Checklist?  Yes    9. FEMALES ONLY: Does the patient require a pregnancy test per [address]? no     -If yes, the result was:  na    10. Are all present in agreement?   Yes    Those present for time out:  Shar Lyles RN, Dr. Aisha Gipson CRNA, Keaton Lyles RN

## 2023-02-27 NOTE — PROGRESS NOTES
Patient on gamma knife table intubated and sedated with a warn blanket and scds on.  2 CRNAs and this RN at control desk. CBCT completed. Physicians looking at scan. Vitals stable.

## 2023-02-27 NOTE — PROGRESS NOTES
Patient admitted to PACU # 11 from OR at 1035 post Gamma Knife. Attached to PACU monitoring system and report received from anesthesia provider. Patient was reported to be hemodynamically stable during procedure. Patient drowsy on admission and denied pain. Pt has two visible sutures on forehead that are c/d/I. Pt SB on monitor. Pt on 2 L NC. Will continue to monitor.

## 2023-02-27 NOTE — ANESTHESIA PRE PROCEDURE
Department of Anesthesiology  Preprocedure Note       Name:  Ran Reyes   Age:  81 y.o.  :  1950                                          MRN:  [id number]         Date:  2023      Surgeon: * Surgery not found *    Procedure:     Medications prior to admission:   Prior to Admission medications    Medication Sig Start Date End Date Taking? Authorizing Provider   amLODIPine (NORVASC) 10 MG tablet Take 10 mg by mouth daily    Historical Provider, MD   buprenorphine-naloxone (SUBOXONE) 8-2 MG SUBL SL tablet Place 1.5 tablets under the tongue daily. Historical Provider, MD   melatonin 3 MG TABS tablet Take 3 mg by mouth daily    Historical Provider, MD   meloxicam (MOBIC) 15 MG tablet Take 15 mg by mouth daily    Historical Provider, MD   METHYL SALICYLATE-LIDO-MENTHOL EX Apply topically    Historical Provider, MD   mirtazapine (REMERON) 30 MG tablet Take 15 mg by mouth nightly    Historical Provider, MD   Multiple Vitamin (MULTIVITAMIN ADULT) TABS Take by mouth    Historical Provider, MD   omeprazole (PRILOSEC) 20 MG delayed release capsule Take 20 mg by mouth daily    Historical Provider, MD   OXcarbazepine (TRILEPTAL) 300 MG tablet Take 300 mg by mouth 2 times daily    Historical Provider, MD   potassium chloride 20 MEQ/15ML (10%) oral solution Take by mouth daily    Historical Provider, MD   pregabalin (LYRICA) 300 MG capsule Take 300 mg by mouth 2 times daily. Historical Provider, MD       Current medications:    Current Outpatient Medications   Medication Sig Dispense Refill    amLODIPine (NORVASC) 10 MG tablet Take 10 mg by mouth daily      buprenorphine-naloxone (SUBOXONE) 8-2 MG SUBL SL tablet Place 1.5 tablets under the tongue daily.       melatonin 3 MG TABS tablet Take 3 mg by mouth daily      meloxicam (MOBIC) 15 MG tablet Take 15 mg by mouth daily      METHYL SALICYLATE-LIDO-MENTHOL EX Apply topically      mirtazapine (REMERON) 30 MG tablet Take 15 mg by mouth nightly      Multiple Vitamin (MULTIVITAMIN ADULT) TABS Take by mouth      omeprazole (PRILOSEC) 20 MG delayed release capsule Take 20 mg by mouth daily      OXcarbazepine (TRILEPTAL) 300 MG tablet Take 300 mg by mouth 2 times daily      potassium chloride 20 MEQ/15ML (10%) oral solution Take by mouth daily      pregabalin (LYRICA) 300 MG capsule Take 300 mg by mouth 2 times daily. Current Facility-Administered Medications   Medication Dose Route Frequency Provider Last Rate Last Admin    bupivacaine (PF) (MARCAINE) 0.5 % injection 150 mg  30 mL IntraDERmal Once Dia Monique MD        0.9 % sodium chloride bolus  500 mL IntraVENous Once Dia Monique MD        lidocaine PF 1 % injection 30 mL  30 mL IntraDERmal Once Dia Monique MD        ondansetron [facility]) injection 4 mg  4 mg IntraVENous Once Dia Monique MD        dexamethasone (DECADRON) injection 4 mg  4 mg IntraDERmal Once Dia Monique MD        sodium bicarbonate 4.2 % injection 1.5 mEq  1.5 mEq IntraDERmal Once Dia Monique MD        neomycin-bacitracin-polymyxin (NEOSPORIN) ointment   Topical Once Dia Monique MD        vitamin E capsule 100 Units  100 Units Oral Once Dia Monique MD           Allergies: Allergies   Allergen Reactions    Bupropion     Diclofenac     Etodolac        Problem List:  There is no problem list on file for this patient.       Past Medical History:        Diagnosis Date    AAA (abdominal aortic aneurysm)     Anxiety     Cervicalgia     Chronic back pain     Depression     HTN (hypertension)     Tobacco use     Trigeminal neuralgia of right side of face        Past Surgical History:        Procedure Laterality Date    APPENDECTOMY      BACK SURGERY      CHOLECYSTECTOMY      KNEE SURGERY      RHIZOTOMY W/ RADIOFREQUENCY ABLATION Right     percuaneous stereotactic radiofrequency rhizotomy (PSR) for trigeminal neuralgia    SHOULDER SURGERY         Social History:    Social History Tobacco Use    Smoking status: Not on file    Smokeless tobacco: Not on file   Substance Use Topics    Alcohol use: Not on file                                Counseling given: Not Answered      Vital Signs (Current):   Vitals:    02/27/23 0620 02/27/23 0625   BP: 117/63    Pulse: 60    Temp: 97.2 °F (36.2 °C)    TempSrc: Temporal    SpO2: 92%    Weight:  176 lb 4.8 oz (80 kg)   Height:  5' 10\" (1.778 m)                                              BP Readings from Last 3 Encounters:   02/27/23 117/63   02/06/23 120/87       NPO Status:                                                                                 BMI:   Wt Readings from Last 3 Encounters:   02/27/23 176 lb 4.8 oz (80 kg)   02/06/23 173 lb 3.2 oz (78.6 kg)     Body mass index is 25.3 kg/m². CBC: No results found for: WBC, RBC, HGB, HCT, MCV, RDW, PLT    CMP: No results found for: NA, K, CL, CO2, BUN, CREATININE, GFRAA, AGRATIO, LABGLOM, GLUCOSE, GLU, PROT, CALCIUM, BILITOT, ALKPHOS, AST, ALT    POC Tests: No results for input(s): POCGLU, POCNA, POCK, POCCL, POCBUN, POCHEMO, POCHCT in the last 72 hours.     Coags: No results found for: PROTIME, INR, APTT    HCG (If Applicable): No results found for: PREGTESTUR, PREGSERUM, HCG, HCGQUANT     ABGs: No results found for: PHART, PO2ART, FKM3WYZ, HOW8AJB, BEART, S7SLOEOM     Type & Screen (If Applicable):  No results found for: LABABO, LABRH    Drug/Infectious Status (If Applicable):  No results found for: HIV, HEPCAB    COVID-19 Screening (If Applicable): No results found for: COVID19        Anesthesia Evaluation  Patient summary reviewed and Nursing notes reviewed no history of anesthetic complications:   Airway: Mallampati: II  TM distance: >3 FB   Neck ROM: full  Mouth opening: > = 3 FB   Dental: normal exam         Pulmonary:Negative Pulmonary ROS and normal exam                               Cardiovascular:    (+) hypertension:,                   Neuro/Psych:   (+) neuromuscular disease:, psychiatric history:            GI/Hepatic/Renal: Neg GI/Hepatic/Renal ROS            Endo/Other: Negative Endo/Other ROS                    Abdominal:             Vascular: negative vascular ROS. Other Findings:           Anesthesia Plan      general     ASA 3       Induction: intravenous. MIPS: Postoperative opioids intended and Prophylactic antiemetics administered. Anesthetic plan and risks discussed with patient. Plan discussed with CRNA.     Attending anesthesiologist reviewed and agrees with Preprocedure content                [place] MD Michael   2/27/2023

## 2023-02-27 NOTE — PROGRESS NOTES
Gamma Knife Procedure Note    Name: Leonila Tran  : 1950    Diagnosis: Right Trigeminal Neuralgia    Procedure: Gamma Knife Stereotactic Radiosurgery [facility])    Description of Procedure  Patient underwent conscious sedation/stereotactic ring placement per Dr. Hitesh Albright. Patient then completed stereotactic MRI including T1 and CISS sequences, both pre- and post-contrast. Treatment planning was then completed and is summarized as follows:    Right trigeminal nerve: Dose  82  Gy defined at Dmax. A single 4 mm shot without sector blocking was employed. Attention was given to limiting dose to the rightward brainstem and right mesial temporal lobe. Treatment Course  Successful completion of SRS.     Post Treatment Planning  Per Dr. Robert Coles MD

## 2023-02-27 NOTE — PROGRESS NOTES
PACU Transfer Note    Vitals:    02/27/23 1115   BP: 131/73   Pulse: 58   Resp: 17   Temp: 97.1 °F (36.2 °C)   SpO2: 95%       In: 1660 [P.O.:240;  I.V.:1420]  Out: 0     Pain assessment:  none  Pain level: 0    Report given to Receiving unit RN.    2/27/2023 11:19 AM

## 2023-02-27 NOTE — DISCHARGE INSTRUCTIONS
GAMMA KNIFE POST-PROCEDURE INSTRUCTIONS    You may gently wash your hair and face two days after your procedure. Be gentle and do not rub the pin sites. Pat areas dry. WHAT TO DO IF YOU EXPERIENCE BLEEDING AT THE PIN SITES:  Remove any steri strips or band-aids (if present) that are in place at the site that is bleeding. Using the gauze pads given to you by the [facility] RN, apply direct, one finger pressure continuously for 5 minutes. After 5 minutes inspect the site for bleeding. If the bleeding continues, repeat holding pressure, this time for 10 minutes. After 10 minutes, inspect the site again. If bleeding continues, proceed to the nearest emergency room for placement of a suture (stich). If you have sutures (stitches), you may shower with them, but keep them clean and dry otherwise. They usually dissolve on their own in 7-10 days, but if they do not, they will be removed at your two-week follow up visit. You have undergone a procedure with sedation. You have been given medicines that affect your judgment or impair your ability to operate heavy machines and automobiles. You may not drive for 24 hours after your procedure. Please see the attached information regarding Sedation. Avoid hair spray, mousse, gels, hair color, permanent solutions, or any other products that could cause irritation until your pin sites heal.  You may notice blood-tinged water when washing your hair. This may be dried blood in your hair from the application of the head frame. Swelling above the eyes may occur within 48 hours of surgery. You may apply cool compresses to your forehead if swelling occurs. Some patients experiences tingling or temporary numbness above the pin sites. You may return to work or school after 24 hours if you feel well enough. You should resume all of your regular activities unless the physician has instructed you otherwise. You may resume your usual diet right away.   If you are feeling nauseated, start with a bland diet. If you develop any signs of infection (redness, swelling, drainage or irritation at pin sites, fever >101 F for more than 24 hours), call your physician. Do NOT stop taking your medications. Follow up with your physician as directed. If you have any questions during regular business hours, call the [facility] nurse at [phone]. For questions during off-business hours and on weekends, contact Dr. Therese Alonzo office directly at [phone]. [address] [facility]    There are potential side effects of anesthesia or sedation you may experience for the first 24 hours. These side effects include:    Confusion or Memory loss, Dizziness, or Delayed Reaction Times   [x]A responsible person should be with you for the next 24 hours. Do not operate any vehicles (automobiles, bicycles, motorcycles) or power tools or machinery for 24 hours. Do not sign any legal documents or make any legal decisions for 24 hours. Do not drink alcohol for 24 hours or while taking narcotic pain medication. Nausea    [x]Start with light diet and progress to your normal diet as you feel like eating. However, if you experience nausea or repeated episodes of vomiting which persist beyond 12-24 hours, notify your physician. Once nausea has passed, remember to keep drinking fluids. Difficulty Passing Urine  [x]Drink extra amounts of fluid today. Notify your physician if you have not urinated within 8 hours after your procedure or you feel uncomfortable. Irritated Throat from a Breathing Tube  [x]Drink extra amounts of fluid today. Lozenges may help. Muscle Aches  [x]You may experience some generalized body aches as your muscles recover from medications used to relax them during surgery. These will gradually subside. MEDICATION INSTRUCTIONS:  []Prescription(S) x  0   sent with you. Use as directed.   When taking pain medications, you may experience the side effect of dizziness or drowsiness. Do not drink alcohol or drive when taking these medications. [x]Give the list of your medications to your primary care physician on your next visit. Keep your med list updated and carry it with in case of emergencies. [] Narcotic pain medications can cause the side effect of significant constipation. You may want to add a stool softener to your postoperative medication schedule or speak to your surgeon on how best to manage this side effect. NARCOTIC SAFETY:  Your pain medicine is only for you to take. Safely store your medicines. Store pills up high and out of reach of children and pets. Ensure safety caps are snapped tightly  Keep track of how many pills you have left    Unused medication can be disposed of by taking them to a drop-off box or take-back program that is authorized by the [facility]. Access to a site near you can be found on the [facility] [employer] website ([address] [facility] [employer]. [facility] [employer]). If you have a CPAP machine, it is very important that you use it daily during all periods of sleep and daytime rest during your recovery at home. Surgery and Anesthesia place a significant amount of stress on your body. Using your CPAP will help keep you safe and lessen the negative effects of that stress. FOLLOW-UP RECOVERY CARE:  [x]Call the office  for follow-up appointment and problems    Watch for these possible complications, symptoms, or side effects of anesthesia. Call physician if they or any other problems occur:  Signs of INFECTION   > Fever over 101°     > Redness, swelling, hardness or warmth at the operative site   >Foul smelling or cloudy drainage at the operative site   Unrelieved PAIN  Unrelieved NAUSEA  Blood soaked dressing.   (Some oozing may be normal)  Inability to urinate      Numb, pale, blue, cold or tingling extremity      Physician: Dr. Consuelo Shaw     The above instructions were reviewed with patient/significant other.  The following additional patient specific information was reviewed with the patient/significant other:  [x]Procedure/physician specific instructions  []Medication information sheet(S) including potential side effects  []Shelbys egress test  []Pain Ball management  []FAQ Catheter associated blood stream infections  []FAQ Surgical Site Infections  []Other-    I have read and understand the instructions given to me: ____________________________________________   (Patient/S.O. Signature)            Date/time 2/27/2023 11:04 AM         PACU:  [phone]   M-F 700 AM - 7 PM      SAME DAY SERVICES:  [phone] M-F 7AM-6PM        If you smoke STOP. We care about your health!

## 2023-02-27 NOTE — ANESTHESIA POSTPROCEDURE EVALUATION
Department of Anesthesiology  Postprocedure Note    Patient: Castro Ahmadi  MRN: [id number]  YOB: 1950  Date of evaluation: 2/27/2023      Procedure Summary     Date: 02/27/23 Room / Location: [address] [place] [address] Gamma Knife    Anesthesia Start: 0804 Anesthesia Stop: 1026    Procedure: GAMMAKNIFE W ANESTHESIA Diagnosis:     Scheduled Providers: Kirk Diaz MD Responsible Provider: Kirk Diaz MD    Anesthesia Type: general ASA Status: 3          Anesthesia Type: No value filed.     Mavis Phase I: Mavis Score: 10    Mavis Phase II:        Anesthesia Post Evaluation    Patient location during evaluation: PACU  Patient participation: complete - patient participated  Level of consciousness: awake and alert  Airway patency: patent  Nausea & Vomiting: no nausea and no vomiting  Complications: no  Cardiovascular status: hemodynamically stable  Respiratory status: acceptable  Hydration status: euvolemic  Multimodal analgesia pain management approach

## 2023-02-27 NOTE — OP NOTE
[facility]  PATIENT NAME:   Leonila Tran   MR #:  [id number]  YOB: 1950   ACCOUNT #:  [de-identified]  SURGEON:  Mabel Rosales MD   ADMIT DATE:  2/27/2023  SERVICE:  Neurosurgery  DICTATED BY: Mabel Rosales MD   SURGERY DATE:  2/27/2023           OPERATIVE REPORT       PREOPERATIVE DIAGNOSIS:     1. RIGHT trigeminal neuralgia     POSTOPERATIVE DIAGNOSIS:     1. RIGHT trigeminal neuralgia    PROCEDURE(S) PERFORMED:    1. Placement of stereotactic head frame   2. Gamma Knife radiosurgery for RIGHT trigeminal neuralgia    NEUROSURGEON: Mabel Rosales MD      RADIATION ONCOLOGIST: Clarita Duran MD    ANESTHESIA: Moderate sedation    COMPLICATIONS: None    INDICATIONS: This is a 70-year-old man with an eight-month history of RIGHT trigeminal neuralgia. The patient underwent a PSR procedure on 8/23/2022 by Dr. Mikal Morrison with relief of V3 pain but residual V2 pain. He is currently on Trileptal and Lyrica with inadequate pain control. We attempted Gamma Knife radiosurgery on 2/6/2023 but the patient became agitated and the procedure was aborted. He returns for [facility] radiosurgery under general anesthesia. The patient was aware of the potential benefits in terms of pain relief and the possible risks including (but not limited to): pin site bleeding/swelling/infection, facial numbness, brain swelling, new neurological symptoms, and/or radiation injury the brain. PERFORMANCE STATUS: 90%    DETAILS OF PROCEDURE: The side of pain was confirmed by the patient, family, nurse, and physician. A vitamin E capsule was affixed to the skin on that side for later visualization on MRI. The patient was intubated and maintained under general anesthesia for the entire procedure. The four pin sites were cleaned with Chloraprep and injected with a mixture of Lidocaine 1%, Marcaine 0.5%, and sodium bicarbonate. The stereotactic head frame was secured with titanium screws.  He was transferred onto the Gamma Knife table and into the treatment position. A cone-beam CT scan was performed and fused with the MRI scan from 2/6/2023. The pre-plan was reviewed and approved by the neurosurgeon, radiation oncologist, and medical physicist. The patient then underwent Gamma Knife radiosurgery using the following parameters:    Target Shot Blocks Dose  Isodose  Beam Time Brainstem Brainstem Temporal      # Gy % Min  10 mm3  Max 10 mm3   RIGHT trigeminal 4 mm 0 82 100 35.56 11.0 17.1 14.5     Integral dose of trigeminal nerve enclosed by the 50% isodose line = 2.2 mJ    The patient tolerated the procedure without difficulty and the stereotactic frame was removed uneventfully. He emerged from general anesthesia and was transported to the recovery room. The patient and his wife were instructed on pin site care and medications.     SPECIMENS REMOVED: None    ESTIMATED BLOOD LOSS: None    TOTAL TIME SPENT WITH PATIENT: In conformance with CMS regulations, I affirm that I was present for the entire neurosurgical portion of the procedure including stereotactic frame placement, target definition, development of the treatment plan, treatment delivery, and stereotactic frame removal.     Bull Santos MD

## 2023-02-27 NOTE — PROGRESS NOTES
Gamma Knife Radiation Delivery Time Out    1. Patient states name and birthdate correctly? Yes    2. Procedure listed on consent Stereotactic Radiosurgery, Gamma Knife for RIGHT trigeminal neuralgia (under general anesthesia)    3. Is this the correct procedure? Yes    4. Is this a trigeminal neuralgia case? Yes    -If yes, what side are we treating? right    -If yes, is the side marked for laterality? yes    5. Has the final radiologist report been reviewed? N/A    6. Does the patient require Keppra prior to treatment delivery? no    7. Does the patient require Ativan prior to treatment delivery?  no    8. Are all present in agreement? Yes    9.  Those present for time out:  Hedda Mcardle, Dr. Israel Quiet RN, [address] [place] [address] RN

## 2023-02-27 NOTE — PROGRESS NOTES
After Gamma Knife procedure, the G-frame was removed by Dr. Sondra Miranda and fixation pin sites were observed for bleeding. None was noted. Pin sites were cleaned with povidone-iodine. Dr. Sondra Miranda then placed sutures at all four pin sites. Patient extubated by Ilana Kelly and then transferred to PACU by Angie Cuevas CRNA, and this RN. Report given to Nitin Mayers and Brandie Lee RN in PACU. All questions answered. PACU to call Tavares Trinidadnda when patient is ready to be discharged.      Jamar Griffin, DURGA

## 2023-02-28 ENCOUNTER — POST-OP TELEPHONE (OUTPATIENT)
Dept: RADIATION ONCOLOGY | Age: 73
End: 2023-02-28

## 2023-02-28 NOTE — PROGRESS NOTES
Spoke to patient POD #1 from [facility] radiosurgery. Patient denies bleeding, or fever. Pt states he had a mild headache due to some swelling on his forehead. He states it is controlled by tylenol and has not used the ice pack provided. This RN provided education r/t cold compress treatment for swelling. Reinforced all other post-procedure instructions. Pt verbalized understanding. Reviewed with patient that the follow-up phone call is scheduled for 3/13. Patient verbalized understanding to call with any new neurological symptoms. Encouraged to call with any questions or concerns.      Angelo Thurman RN

## 2023-03-13 ENCOUNTER — POST-OP TELEPHONE (OUTPATIENT)
Dept: RADIATION ONCOLOGY | Age: 73
End: 2023-03-13

## 2023-03-13 NOTE — PROGRESS NOTES
Two week follow-up phone call with patient has been completed. Pt denies any issues with the pin sites, states that the sutures have almost dissolved. States trigeminal pain is 3-4 times a day with a pain level of 5/10. Overall, patient is very happy with progress. 3.   Has been reminded of their next phone call appointment with Dr. Evangelista Samaniego in 2 weeks. 4.   Any other questions or further follow up will be with Dr. Evangelista Samaniego. Thank you for referring this patient to the [facility] Department for treatment, it has been a pleasure to participate in their care. If the patient requires future Gamma Knife procedures please contact the department directly at [phone].